# Patient Record
Sex: MALE | Race: WHITE | HISPANIC OR LATINO | Employment: FULL TIME | URBAN - METROPOLITAN AREA
[De-identification: names, ages, dates, MRNs, and addresses within clinical notes are randomized per-mention and may not be internally consistent; named-entity substitution may affect disease eponyms.]

---

## 2022-09-11 ENCOUNTER — HOSPITAL ENCOUNTER (EMERGENCY)
Facility: HOSPITAL | Age: 32
Discharge: HOME | End: 2022-09-12
Attending: EMERGENCY MEDICINE | Admitting: EMERGENCY MEDICINE

## 2022-09-11 VITALS
SYSTOLIC BLOOD PRESSURE: 146 MMHG | BODY MASS INDEX: 35.36 KG/M2 | HEIGHT: 66 IN | TEMPERATURE: 98.9 F | WEIGHT: 220 LBS | HEART RATE: 85 BPM | OXYGEN SATURATION: 99 % | DIASTOLIC BLOOD PRESSURE: 97 MMHG | RESPIRATION RATE: 18 BRPM

## 2022-09-11 DIAGNOSIS — S61.216A LACERATION OF RIGHT LITTLE FINGER WITHOUT FOREIGN BODY WITHOUT DAMAGE TO NAIL, INITIAL ENCOUNTER: Primary | ICD-10-CM

## 2022-09-11 PROCEDURE — 99283 EMERGENCY DEPT VISIT LOW MDM: CPT

## 2022-09-12 ENCOUNTER — APPOINTMENT (OUTPATIENT)
Dept: GENERAL RADIOLOGY | Facility: HOSPITAL | Age: 32
End: 2022-09-12

## 2022-09-12 PROCEDURE — 73140 X-RAY EXAM OF FINGER(S): CPT

## 2022-09-12 PROCEDURE — 25010000002 TETANUS-DIPHTH-ACELL PERTUSSIS 5-2.5-18.5 LF-MCG/0.5 SUSPENSION PREFILLED SYRINGE: Performed by: PHYSICIAN ASSISTANT

## 2022-09-12 PROCEDURE — 90471 IMMUNIZATION ADMIN: CPT | Performed by: PHYSICIAN ASSISTANT

## 2022-09-12 PROCEDURE — 90715 TDAP VACCINE 7 YRS/> IM: CPT | Performed by: PHYSICIAN ASSISTANT

## 2022-09-12 RX ORDER — LIDOCAINE HYDROCHLORIDE 10 MG/ML
10 INJECTION, SOLUTION EPIDURAL; INFILTRATION; INTRACAUDAL; PERINEURAL ONCE
Status: COMPLETED | OUTPATIENT
Start: 2022-09-12 | End: 2022-09-12

## 2022-09-12 RX ORDER — BACITRACIN, NEOMYCIN, POLYMYXIN B 400; 3.5; 5 [USP'U]/G; MG/G; [USP'U]/G
1 OINTMENT TOPICAL ONCE
Status: COMPLETED | OUTPATIENT
Start: 2022-09-12 | End: 2022-09-12

## 2022-09-12 RX ADMIN — TETANUS TOXOID, REDUCED DIPHTHERIA TOXOID AND ACELLULAR PERTUSSIS VACCINE, ADSORBED 0.5 ML: 5; 2.5; 8; 8; 2.5 SUSPENSION INTRAMUSCULAR at 01:06

## 2022-09-12 RX ADMIN — LIDOCAINE HYDROCHLORIDE 10 ML: 10 INJECTION, SOLUTION EPIDURAL; INFILTRATION; INTRACAUDAL; PERINEURAL at 02:52

## 2022-09-12 RX ADMIN — BACITRACIN, NEOMYCIN, POLYMYXIN B 1 APPLICATION: 400; 3.5; 5 OINTMENT TOPICAL at 03:18

## 2022-09-12 NOTE — DISCHARGE INSTRUCTIONS
X-rays of the right fifth finger revealed no bony abnormality and no radiopaque foreign body.  Patient tolerated suture placement well.  Tetanus status updated.  Recommend suture removal in 7 days.  Keep wound clean with soap and water and open to the air while at home.  Tylenol/ibuprofen every 4-6 hours as needed for pain.  Return to the ER if any worsening symptoms of redness, warmth, or drainage.

## 2022-09-12 NOTE — ED PROVIDER NOTES
Subjective       This is a healthy 32-year-old male that presents the ER with laceration to the right fifth finger.  Patient says that he just got him from work and was cleaning the kitchen and a box knocked over a glass and it broke.  Patient went to reach for the glass because he was trying to keep it from falling on the floor and hurting his small children, and in the process, he cut his right fifth finger, palmar aspect at the base.  There is no active bleeding at present.  Patient has full range of motion.  He is right-handed.  He reports throbbing pain but denies any numbness or tingling.  Tetanus status is not up-to-date.  He does not believe that he has any glass foreign body present.  He said that a large piece poked his hand.  No other injuries noted.      History provided by:  Patient  Finger Laceration  Location:  Right 5th finger laceration; palmar base of phalanx  Onset quality:  Sudden  Duration:  2 hours  Timing:  Constant  Progression:  Unchanged  Chronicity:  New  Context:  Pt was cleaning kitchen and a box knocked a glass over and it broke.  Pt went to reach for glass and accidentally cut his hand; palmar aspect of 5th phalanx.  No active bleeding.  Relieved by:  Nothing  Worsened by:  Range of motion  Ineffective treatments:  Nothing  Associated symptoms: no diarrhea        Review of Systems   Gastrointestinal: Negative for diarrhea.   Musculoskeletal: Positive for arthralgias (Right 5th finger laceration). Negative for joint swelling.   Skin: Positive for wound (laceration to base, palmar aspect of 5th phalanx.). Negative for color change.   Neurological: Negative.  Negative for numbness (No numbness or tingling to the right fifth finger.).       No past medical history on file.    No Known Allergies    No past surgical history on file.    No family history on file.    Social History     Socioeconomic History   • Marital status: Single   Tobacco Use   • Smoking status: Light Tobacco Smoker   •  Smokeless tobacco: Never Used           Objective   Physical Exam  Vitals and nursing note reviewed.   Constitutional:       General: He is not in acute distress.     Appearance: Normal appearance.   Cardiovascular:      Pulses:           Radial pulses are 2+ on the right side.      Comments: Strong right radial and ulnar pulses and brisk capillary refill  Pulmonary:      Effort: Pulmonary effort is normal.      Comments: Regular respiratory rate  Musculoskeletal:      Comments: No bony tenderness to right fifth finger.  Full range of motion, strong against resistance, both extension and flexion.  No concern for tendon injury.   Skin:     Findings: Laceration present.      Comments: 1.5 cm laceration at the palmar base of the right fifth phalanx.  No active bleeding.   Neurological:      Mental Status: He is alert.         Laceration Repair    Date/Time: 9/12/2022 2:10 AM  Performed by: Cathi Davidson PA-C  Authorized by: Jer Villalobos MD     Consent:     Consent obtained:  Verbal    Consent given by:  Patient    Risks, benefits, and alternatives were discussed: yes      Risks discussed:  Infection, poor cosmetic result and poor wound healing  Universal protocol:     Patient identity confirmed:  Verbally with patient  Anesthesia:     Anesthesia method:  Local infiltration    Local anesthetic:  Lidocaine 1% w/o epi  Laceration details:     Location:  Finger    Finger location:  R small finger    Length (cm):  1.5    Depth (mm):  3  Exploration:     Imaging obtained: x-ray      Imaging outcome: foreign body not noted      Wound exploration: wound explored through full range of motion and entire depth of wound visualized      Wound extent: no foreign bodies/material noted, no tendon damage noted and no underlying fracture noted      Contaminated: no    Treatment:     Area cleansed with:  Povidone-iodine and saline    Amount of cleaning:  Standard    Irrigation solution:  Sterile saline    Irrigation method: 4 x  "4's.    Debridement:  None    Undermining:  None    Scar revision: no    Skin repair:     Repair method:  Sutures    Suture size:  4-0    Suture material:  Nylon    Suture technique:  Simple interrupted    Number of sutures:  5  Approximation:     Approximation:  Close  Repair type:     Repair type:  Complex  Post-procedure details:     Dressing:  Antibiotic ointment and adhesive bandage    Procedure completion:  Tolerated well, no immediate complications               ED Course  ED Course as of 09/12/22 0313   Mon Sep 12, 2022   0211 Patient tolerated suture placement well.  Wound was not contaminated and x-rays revealed no radiopaque foreign body.  Tetanus status was updated.  I applied #5 sutures.  See procedure note for details.  Recommend suture removal in 7 days.  Wound is clean and there is no need for antibiotics on discharge.  Return to the ER if any worsening symptoms of redness, warmth, or drainage. [FC]      ED Course User Index  [FC] Cathi Davidson PA-C            No results found for this or any previous visit (from the past 24 hour(s)).  Note: In addition to lab results from this visit, the labs listed above may include labs taken at another facility or during a different encounter within the last 24 hours. Please correlate lab times with ED admission and discharge times for further clarification of the services performed during this visit.    XR Finger 2+ View Right   Final Result      No radiopaque foreign body is seen in the right fifth digit.      Electronically signed by:  Refugio Camacho M.D.     9/12/2022 12:56 AM Mountain Time        Vitals:    09/11/22 2347   BP: 146/97   BP Location: Right arm   Patient Position: Sitting   Pulse: 85   Resp: 18   Temp: 98.9 °F (37.2 °C)   TempSrc: Oral   SpO2: 99%   Weight: 99.8 kg (220 lb)   Height: 167.6 cm (66\")     Medications   neomycin-bacitracin-polymyxin (NEOSPORIN) ointment 1 application (has no administration in time range)   Tetanus-Diphth-Acell " Pertussis (BOOSTRIX) injection 0.5 mL (0.5 mL Intramuscular Given 9/12/22 0106)   lidocaine PF 1% (XYLOCAINE) injection 10 mL (10 mL Infiltration Given 9/12/22 5042)     ECG/EMG Results (last 24 hours)     ** No results found for the last 24 hours. **        No orders to display                                         MDM    Final diagnoses:   Laceration of right little finger without foreign body without damage to nail, initial encounter       ED Disposition  ED Disposition     ED Disposition   Discharge    Condition   Stable    Comment   --             Routine PCP    Schedule an appointment as soon as possible for a visit   As needed         Medication List      Stop    cyclobenzaprine 10 MG tablet  Commonly known as: FLEXERIL     ibuprofen 800 MG tablet  Commonly known as: QUINTIN RIVERS Farrah J, PALuisC  09/12/22 9361

## 2022-09-20 ENCOUNTER — HOSPITAL ENCOUNTER (EMERGENCY)
Facility: HOSPITAL | Age: 32
Discharge: HOME | End: 2022-09-20

## 2022-09-20 VITALS
HEART RATE: 66 BPM | RESPIRATION RATE: 16 BRPM | DIASTOLIC BLOOD PRESSURE: 85 MMHG | SYSTOLIC BLOOD PRESSURE: 128 MMHG | OXYGEN SATURATION: 97 %

## 2022-09-20 PROCEDURE — 99202 OFFICE O/P NEW SF 15 MIN: CPT

## 2024-04-15 ENCOUNTER — APPOINTMENT (OUTPATIENT)
Dept: CT IMAGING | Facility: HOSPITAL | Age: 34
End: 2024-04-15
Payer: MEDICAID

## 2024-04-15 ENCOUNTER — HOSPITAL ENCOUNTER (EMERGENCY)
Facility: HOSPITAL | Age: 34
Discharge: HOME | End: 2024-04-16
Attending: EMERGENCY MEDICINE
Payer: MEDICAID

## 2024-04-15 VITALS
TEMPERATURE: 98.1 F | HEART RATE: 61 BPM | HEIGHT: 66 IN | WEIGHT: 225 LBS | SYSTOLIC BLOOD PRESSURE: 160 MMHG | RESPIRATION RATE: 18 BRPM | OXYGEN SATURATION: 98 % | BODY MASS INDEX: 36.16 KG/M2 | DIASTOLIC BLOOD PRESSURE: 105 MMHG

## 2024-04-15 DIAGNOSIS — K21.9 GASTROESOPHAGEAL REFLUX DISEASE WITHOUT ESOPHAGITIS: ICD-10-CM

## 2024-04-15 DIAGNOSIS — R10.9 NONSPECIFIC ABDOMINAL PAIN: Primary | ICD-10-CM

## 2024-04-15 LAB
ALBUMIN SERPL-MCNC: 4.5 G/DL (ref 3.5–5.2)
ALBUMIN/GLOB SERPL: 1.5 G/DL
ALP SERPL-CCNC: 69 U/L (ref 39–117)
ALT SERPL W P-5'-P-CCNC: 19 U/L (ref 1–41)
ANION GAP SERPL CALCULATED.3IONS-SCNC: 11 MMOL/L (ref 5–15)
AST SERPL-CCNC: 19 U/L (ref 1–40)
BACTERIA UR QL AUTO: ABNORMAL /HPF
BASOPHILS # BLD AUTO: 0.08 10*3/MM3 (ref 0–0.2)
BASOPHILS NFR BLD AUTO: 0.7 % (ref 0–1.5)
BILIRUB SERPL-MCNC: 0.2 MG/DL (ref 0–1.2)
BILIRUB UR QL STRIP: NEGATIVE
BUN SERPL-MCNC: 16 MG/DL (ref 6–20)
BUN/CREAT SERPL: 20.5 (ref 7–25)
CALCIUM SPEC-SCNC: 9.1 MG/DL (ref 8.6–10.5)
CHLORIDE SERPL-SCNC: 104 MMOL/L (ref 98–107)
CLARITY UR: CLEAR
CO2 SERPL-SCNC: 25 MMOL/L (ref 22–29)
COLOR UR: YELLOW
CREAT SERPL-MCNC: 0.78 MG/DL (ref 0.76–1.27)
D-LACTATE SERPL-SCNC: 0.8 MMOL/L (ref 0.5–2)
DEPRECATED RDW RBC AUTO: 38.4 FL (ref 37–54)
EGFRCR SERPLBLD CKD-EPI 2021: 121.5 ML/MIN/1.73
EOSINOPHIL # BLD AUTO: 0.19 10*3/MM3 (ref 0–0.4)
EOSINOPHIL NFR BLD AUTO: 1.5 % (ref 0.3–6.2)
ERYTHROCYTE [DISTWIDTH] IN BLOOD BY AUTOMATED COUNT: 12.9 % (ref 12.3–15.4)
GLOBULIN UR ELPH-MCNC: 3 GM/DL
GLUCOSE SERPL-MCNC: 95 MG/DL (ref 65–99)
GLUCOSE UR STRIP-MCNC: NEGATIVE MG/DL
HCT VFR BLD AUTO: 45.1 % (ref 37.5–51)
HGB BLD-MCNC: 14.9 G/DL (ref 13–17.7)
HGB UR QL STRIP.AUTO: ABNORMAL
HOLD SPECIMEN: NORMAL
HOLD SPECIMEN: NORMAL
HYALINE CASTS UR QL AUTO: ABNORMAL /LPF
IMM GRANULOCYTES # BLD AUTO: 0.08 10*3/MM3 (ref 0–0.05)
IMM GRANULOCYTES NFR BLD AUTO: 0.7 % (ref 0–0.5)
KETONES UR QL STRIP: NEGATIVE
LEUKOCYTE ESTERASE UR QL STRIP.AUTO: NEGATIVE
LIPASE SERPL-CCNC: 16 U/L (ref 13–60)
LYMPHOCYTES # BLD AUTO: 3.82 10*3/MM3 (ref 0.7–3.1)
LYMPHOCYTES NFR BLD AUTO: 31.1 % (ref 19.6–45.3)
MCH RBC QN AUTO: 27 PG (ref 26.6–33)
MCHC RBC AUTO-ENTMCNC: 33 G/DL (ref 31.5–35.7)
MCV RBC AUTO: 81.9 FL (ref 79–97)
MONOCYTES # BLD AUTO: 0.54 10*3/MM3 (ref 0.1–0.9)
MONOCYTES NFR BLD AUTO: 4.4 % (ref 5–12)
NEUTROPHILS NFR BLD AUTO: 61.6 % (ref 42.7–76)
NEUTROPHILS NFR BLD AUTO: 7.56 10*3/MM3 (ref 1.7–7)
NITRITE UR QL STRIP: NEGATIVE
NRBC BLD AUTO-RTO: 0 /100 WBC (ref 0–0.2)
PH UR STRIP.AUTO: 6.5 [PH] (ref 5–8)
PLATELET # BLD AUTO: 310 10*3/MM3 (ref 140–450)
PMV BLD AUTO: 9.4 FL (ref 6–12)
POTASSIUM SERPL-SCNC: 4 MMOL/L (ref 3.5–5.2)
PROT SERPL-MCNC: 7.5 G/DL (ref 6–8.5)
PROT UR QL STRIP: NEGATIVE
RBC # BLD AUTO: 5.51 10*6/MM3 (ref 4.14–5.8)
RBC # UR STRIP: ABNORMAL /HPF
REF LAB TEST METHOD: ABNORMAL
SODIUM SERPL-SCNC: 140 MMOL/L (ref 136–145)
SP GR UR STRIP: 1.02 (ref 1–1.03)
SQUAMOUS #/AREA URNS HPF: ABNORMAL /HPF
TROPONIN T SERPL HS-MCNC: <6 NG/L
UROBILINOGEN UR QL STRIP: ABNORMAL
WBC # UR STRIP: ABNORMAL /HPF
WBC NRBC COR # BLD AUTO: 12.27 10*3/MM3 (ref 3.4–10.8)
WHOLE BLOOD HOLD COAG: NORMAL
WHOLE BLOOD HOLD SPECIMEN: NORMAL

## 2024-04-15 PROCEDURE — 25510000001 IOPAMIDOL 61 % SOLUTION: Performed by: EMERGENCY MEDICINE

## 2024-04-15 PROCEDURE — 99285 EMERGENCY DEPT VISIT HI MDM: CPT

## 2024-04-15 PROCEDURE — 93005 ELECTROCARDIOGRAM TRACING: CPT | Performed by: PHYSICIAN ASSISTANT

## 2024-04-15 PROCEDURE — 84484 ASSAY OF TROPONIN QUANT: CPT | Performed by: PHYSICIAN ASSISTANT

## 2024-04-15 PROCEDURE — 83605 ASSAY OF LACTIC ACID: CPT | Performed by: PHYSICIAN ASSISTANT

## 2024-04-15 PROCEDURE — 74177 CT ABD & PELVIS W/CONTRAST: CPT

## 2024-04-15 PROCEDURE — 96374 THER/PROPH/DIAG INJ IV PUSH: CPT

## 2024-04-15 PROCEDURE — 83690 ASSAY OF LIPASE: CPT | Performed by: EMERGENCY MEDICINE

## 2024-04-15 PROCEDURE — 81001 URINALYSIS AUTO W/SCOPE: CPT | Performed by: EMERGENCY MEDICINE

## 2024-04-15 PROCEDURE — 85025 COMPLETE CBC W/AUTO DIFF WBC: CPT | Performed by: EMERGENCY MEDICINE

## 2024-04-15 PROCEDURE — 80053 COMPREHEN METABOLIC PANEL: CPT | Performed by: EMERGENCY MEDICINE

## 2024-04-15 RX ORDER — DICYCLOMINE HYDROCHLORIDE 10 MG/1
20 CAPSULE ORAL ONCE
Status: COMPLETED | OUTPATIENT
Start: 2024-04-15 | End: 2024-04-15

## 2024-04-15 RX ORDER — DICYCLOMINE HCL 20 MG
20 TABLET ORAL EVERY 8 HOURS PRN
Qty: 15 TABLET | Refills: 0 | Status: SHIPPED | OUTPATIENT
Start: 2024-04-15 | End: 2024-04-20

## 2024-04-15 RX ORDER — FAMOTIDINE 40 MG/1
40 TABLET, FILM COATED ORAL NIGHTLY PRN
Qty: 30 TABLET | Refills: 0 | Status: SHIPPED | OUTPATIENT
Start: 2024-04-15 | End: 2024-05-15

## 2024-04-15 RX ORDER — FAMOTIDINE 10 MG/ML
20 INJECTION, SOLUTION INTRAVENOUS ONCE
Status: COMPLETED | OUTPATIENT
Start: 2024-04-15 | End: 2024-04-15

## 2024-04-15 RX ORDER — SODIUM CHLORIDE 9 MG/ML
10 INJECTION, SOLUTION INTRAMUSCULAR; INTRAVENOUS; SUBCUTANEOUS AS NEEDED
Status: DISCONTINUED | OUTPATIENT
Start: 2024-04-15 | End: 2024-04-16 | Stop reason: HOSPADM

## 2024-04-15 RX ADMIN — IOPAMIDOL 100 ML: 612 INJECTION, SOLUTION INTRAVENOUS at 22:16

## 2024-04-15 RX ADMIN — DICYCLOMINE HYDROCHLORIDE 20 MG: 10 CAPSULE ORAL at 23:14

## 2024-04-15 RX ADMIN — FAMOTIDINE 20 MG: 10 INJECTION, SOLUTION INTRAVENOUS at 23:14

## 2024-04-16 LAB
HOLD SPECIMEN: NORMAL
QT INTERVAL: 378 MS
QTC INTERVAL: 408 MS

## 2024-04-16 NOTE — DISCHARGE INSTRUCTIONS
Symptomatic care is recommended. Take all medications as prescribed and instructed. Follow up with primary care and gastroenterology as directed or return to Emergency Department with worsening of symptoms.

## 2024-04-23 LAB
QT INTERVAL: 378 MS
QTC INTERVAL: 408 MS

## 2024-04-23 NOTE — ED PROVIDER NOTES
EMERGENCY DEPARTMENT ENCOUNTER    Pt Name: Daniel Mims  MRN: [id number]  Pt :   1990  Room Number:    Date of encounter:  4/15/2024  PCP: Provider, No Known  ED Provider: CHAKA Del Toro    Historian: Patient    HPI:  Chief Complaint: Abdominal Pain    Context: Daniel Mims is a 33 y.o. male who presents to the ED c/o abdominal pain.  Patient reports that he began to experience abdominal pain Saturday afternoon (2024).  He denies any nausea or vomiting.  He reports no fever.  He has not had diarrhea or constipation.  He reports no urinary complaints.  Patient's prior abdominal surgeries include an appendectomy.  Patient shares that he does vape.  He does not use any alcohol.  He reports trying some Pepto-Bismol for his symptoms but that it did not help.  He denies anything specific that seems to make his symptoms better or worse and reports no additional associated symptoms on exam.  Spouse was present at bedside shares that she believes that his symptoms are from eating too much spicy food.  HPI     REVIEW OF SYSTEMS  A chief complaint appropriate review of systems was completed and is negative except as noted in the HPI.     PAST MEDICAL HISTORY  No past medical history on file.    PAST SURGICAL HISTORY  No past surgical history on file.    FAMILY HISTORY  No family history on file.    SOCIAL HISTORY  Social History     Socioeconomic History    Marital status: Single   Tobacco Use    Smoking status: Light Smoker    Smokeless tobacco: Never       ALLERGIES  Patient has no known allergies.    PHYSICAL EXAM  Physical Exam  GENERAL:   Appears in no acute distress.  Nontoxic in appearance.  HENT: Nares patent.  EYES: No scleral icterus.  CV: Regular rhythm, regular rate.  RESPIRATORY: Normal effort.  ABDOMEN: Soft, nontender. No evidence of acute abdomen on physical exam. No rebound or guarding. No peritoneal signs.   MUSCULOSKELETAL: No deformities.   NEURO: Alert, moves all  extremities, follows commands.  SKIN: Warm, dry, no rash visualized.    I have reviewed the triage vital signs and nursing notes.     LAB RESULTS  Results for orders placed or performed during the hospital encounter of 04/15/24   Comprehensive Metabolic Panel    Specimen: Blood   Result Value Ref Range    Glucose 95 65 - 99 mg/dL    BUN 16 6 - 20 mg/dL    Creatinine 0.78 0.76 - 1.27 mg/dL    Sodium 140 136 - 145 mmol/L    Potassium 4.0 3.5 - 5.2 mmol/L    Chloride 104 98 - 107 mmol/L    CO2 25.0 22.0 - 29.0 mmol/L    Calcium 9.1 8.6 - 10.5 mg/dL    Total Protein 7.5 6.0 - 8.5 g/dL    Albumin 4.5 3.5 - 5.2 g/dL    ALT (SGPT) 19 1 - 41 U/L    AST (SGOT) 19 1 - 40 U/L    Alkaline Phosphatase 69 39 - 117 U/L    Total Bilirubin 0.2 0.0 - 1.2 mg/dL    Globulin 3.0 gm/dL    A/G Ratio 1.5 g/dL    BUN/Creatinine Ratio 20.5 7.0 - 25.0    Anion Gap 11.0 5.0 - 15.0 mmol/L    eGFR 121.5 >60.0 mL/min/1.73   Lipase    Specimen: Blood   Result Value Ref Range    Lipase 16 13 - 60 U/L   Urinalysis With Microscopic If Indicated (No Culture) - Urine, Clean Catch    Specimen: Urine, Clean Catch   Result Value Ref Range    Color, UA Yellow Yellow, Straw    Appearance, UA Clear Clear    pH, UA 6.5 5.0 - 8.0    Specific Gravity, UA 1.022 1.001 - 1.030    Glucose, UA Negative Negative    Ketones, UA Negative Negative    Bilirubin, UA Negative Negative    Blood, UA Trace (A) Negative    Protein, UA Negative Negative    Leuk Esterase, UA Negative Negative    Nitrite, UA Negative Negative    Urobilinogen, UA 0.2 E.U./dL 0.2 - 1.0 E.U./dL   CBC Auto Differential    Specimen: Blood   Result Value Ref Range    WBC 12.27 (H) 3.40 - 10.80 10*3/mm3    RBC 5.51 4.14 - 5.80 10*6/mm3    Hemoglobin 14.9 13.0 - 17.7 g/dL    Hematocrit 45.1 37.5 - 51.0 %    MCV 81.9 79.0 - 97.0 fL    MCH 27.0 26.6 - 33.0 pg    MCHC 33.0 31.5 - 35.7 g/dL    RDW 12.9 12.3 - 15.4 %    RDW-SD 38.4 37.0 - 54.0 fl    MPV 9.4 6.0 - 12.0 fL    Platelets 310 140 - 450 10*3/mm3     Neutrophil % 61.6 42.7 - 76.0 %    Lymphocyte % 31.1 19.6 - 45.3 %    Monocyte % 4.4 (L) 5.0 - 12.0 %    Eosinophil % 1.5 0.3 - 6.2 %    Basophil % 0.7 0.0 - 1.5 %    Immature Grans % 0.7 (H) 0.0 - 0.5 %    Neutrophils, Absolute 7.56 (H) 1.70 - 7.00 10*3/mm3    Lymphocytes, Absolute 3.82 (H) 0.70 - 3.10 10*3/mm3    Monocytes, Absolute 0.54 0.10 - 0.90 10*3/mm3    Eosinophils, Absolute 0.19 0.00 - 0.40 10*3/mm3    Basophils, Absolute 0.08 0.00 - 0.20 10*3/mm3    Immature Grans, Absolute 0.08 (H) 0.00 - 0.05 10*3/mm3    nRBC 0.0 0.0 - 0.2 /100 WBC   Lactic Acid, Plasma    Specimen: Blood   Result Value Ref Range    Lactate 0.8 0.5 - 2.0 mmol/L   Single High Sensitivity Troponin T    Specimen: Blood   Result Value Ref Range    HS Troponin T <6 <22 ng/L   Urinalysis, Microscopic Only - Urine, Clean Catch    Specimen: Urine, Clean Catch   Result Value Ref Range    RBC, UA 3-5 (A) None Seen, 0-2 /HPF    WBC, UA 0-2 None Seen, 0-2 /HPF    Bacteria, UA None Seen None Seen, Trace /HPF    Squamous Epithelial Cells, UA None Seen None Seen, 0-2 /HPF    Hyaline Casts, UA None Seen 0 - 6 /LPF    Methodology Automated Microscopy    ECG 12 Lead Other; generalized pain   Result Value Ref Range    QT Interval 378 ms    QTC Interval 408 ms   Green Top (Gel)   Result Value Ref Range    Extra Tube Hold for add-ons.    Lavender Top   Result Value Ref Range    Extra Tube hold for add-on    Gold Top - SST   Result Value Ref Range    Extra Tube Hold for add-ons.    Gray Top   Result Value Ref Range    Extra Tube Hold for add-ons.    Light Blue Top   Result Value Ref Range    Extra Tube Hold for add-ons.        If labs were ordered, I independently reviewed the results and considered them in treating the patient.    RADIOLOGY  CT Abdomen Pelvis With Contrast   Final Result   Normal exam.               Electronically Signed: Ryan German MD     4/15/2024 10:25 PM EDT     Workstation ID: [id number]        [x] Radiologist's Report Reviewed:  I  ordered and independently interpreted the above noted radiographic studies.  See radiologist's dictation for official interpretation.      PROCEDURES    Procedures    ECG 12 Lead Other; generalized pain   Preliminary Result   Test Reason : Other~   Blood Pressure :   */*   mmHG   Vent. Rate :  70 BPM     Atrial Rate :  70 BPM      P-R Int : 170 ms          QRS Dur :  84 ms       QT Int : 378 ms       P-R-T Axes :  36  17  45 degrees      QTc Int : 408 ms      Normal sinus rhythm   Normal ECG   No previous ECGs available      Referred By: RICK           Confirmed By:           MEDICATIONS GIVEN IN ER    Medications   iopamidol (ISOVUE-300) 61 % injection 100 mL (100 mL Intravenous Given 4/15/24 2216)   famotidine (PEPCID) injection 20 mg (20 mg Intravenous Given 4/15/24 2314)   dicyclomine (BENTYL) capsule 20 mg (20 mg Oral Given 4/15/24 2314)       MEDICAL DECISION MAKING, PROGRESS, and CONSULTS   Medical Decision Making  Problems Addressed:  Gastroesophageal reflux disease without esophagitis: complicated acute illness or injury  Nonspecific abdominal pain: complicated acute illness or injury    Amount and/or Complexity of Data Reviewed  Labs: ordered.  Radiology: ordered.  ECG/medicine tests: ordered.    Risk  Prescription drug management.        All labs have been independently reviewed by me.  All radiology studies have been interpreted by me and the radiologist dictating the report.  All EKG's have been independently interpreted by me as well as and overseeing attending physician.    [] Discussed with radiology regarding test interpretation:    Discussion below represents my analysis of pertinent findings related to patient's condition, differential diagnosis, treatment plan and final disposition.    Differential diagnosis:  The differential diagnosis associated with the patient's presentation includes: Dyspepsia, GERD, viral gastroenteritis, constipation, gallbladder disease, pancreatitis, diverticulitis,  kidney stone, UTI, hernia, bowel obstruction, colitis and others.      Additional sources  Discussed/ obtained information from independent historians:   [x] Spouse  [] Parent  [] Family member  [] Friend  [] EMS   [] Other:  External (non-ED) record review:   [] Inpatient record:   [] Office record:   [] Outpatient record:   [] Prior Outpatient labs:   [] Prior Outpatient radiology:   [] Primary Care record:   [] Outside ED record:   [] Other:   Patient's care impacted by:   [] Diabetes  [] Hypertension  [] Hyperlipidemia  [] Hypothyroidism   [] Coronary Artery Disease   [] COPD   [] Cancer   [] Obesity  [] GERD   [] Tobacco Abuse   [] Substance Abuse    [] Anxiety   [] Depression   [] Other:   Care significantly affected by Social Determinants of Health (housing and economic circumstances, unemployment)    [] Yes     [x] No   If yes, Patient's care significantly limited by  Social Determinants of Health including:   [] Inadequate housing   [] Low income   [] Alcoholism and drug addiction in family   [] Problems related to primary support group   [] Unemployment   [] Problems related to employment   [] Other Social Determinants of Health:     Shared decision making:  I had a discussion with the patient/family regarding diagnosis, diagnostic results, treatment plan, and medications.  The patient/family indicated understanding of these instructions.  I spent adequate time at the bedside preceding discharge necessary to personally discuss the aftercare instructions, giving patient education, providing explanations of the results of our evaluations/findings, and my decision making to assure that the patient/family understand the plan of care.  Time was allotted to answer questions at that time and throughout the ED course.  Emphasis was placed on timely follow-up after discharge.  I also discussed the potential for the development of an acute emergent condition requiring further evaluation, admission, or even surgical  intervention. I discussed that we found nothing during the visit today indicating the need for further workup, admission, or the presence of an unstable medical condition.  I encouraged the patient to return to the emergency department immediately for ANY concerns, worsening, new complaints, or if symptoms persist and unable to seek follow-up in a timely fashion.  The patient/family expressed understanding and agreement with this plan.  The patient will follow-up with primary care for reevaluation.      Orders placed during this visit:  Orders Placed This Encounter   Procedures    CT Abdomen Pelvis With Contrast    [facility] Draw    Comprehensive Metabolic Panel    Lipase    Urinalysis With Microscopic If Indicated (No Culture) - Urine, Clean Catch    CBC Auto Differential    Lactic Acid, Plasma    Single High Sensitivity Troponin T    Urinalysis, Microscopic Only - Urine, Clean Catch    ECG 12 Lead Other; generalized pain    CBC & Differential    Green Top (Gel)    Lavender Top    Gold Top - SST    Gray Top    Light Blue Top       I considered prescription management  with:   [] Pain medication  [] Antiviral  [] Antibiotic   [] Other:   Rationale:    ED Course:    ED Course as of 04/23/24 1106   Mon Apr 15, 2024   2047 Vitals and Telemetry tracing was reviewed and directly interpreted by myself demonstrating blood pressure 160/105, afebrile, heart rate of 74, respirations of 18 breaths/min and oxygen saturation 97% on room air [JG]   2047 BP(!): 160/105 [JG]   2047 Temp: 98.1 °F (36.7 °C) [JG]   2047 Heart Rate: 74 [JG]   2047 Resp: 18 [JG]   2047 SpO2: 97 % [JG]   2200 EKG personally interpreted by myself in addition to interpretation by attending without evidence of acute ischemic changes.   [JG]   2245 CT scan of abdomen and pelvis personally interpreted by myself with official read provided by radiology demonstrates no acute abnormalities. [JG]   2300 Labs studies were reviewed and directly interpreted by myself  and demonstrated no acute findings [JG]   2306 On reassessment at bedside patient continues to complain of some abdominal discomfort.  I reviewed findings of ER workup including labs and imaging with patient.  I did share with him that we will be treating his symptoms and there was nothing acute in the findings.  And is agreeable to plan of care.  Will medicate patient and reassess. [JG]   2343 Patient presents to ED with complaints of abdominal pain. Abdominal exam without peritoneal signs. No evidence of acute abdomen at this time. Well appearing. Low suspicion for acute hepatobiliary disease (includng acute cholecystitis), acute pancreatitis, PUD (including perforation), acute infectious processes (pneumonia, hepatitis, pyelonephritis) bowel obstruction or viscus perforation. Presentation not consistent with other acute, emergent causes of abdominal pain at this time. Labs, imaging without acute findings. EKG shows normal sinus rhythm. Patient responded well to supportive care in ED. Discharged with continued symptomatic care and outpatient follow up as recommended. Return precautions provided.  [JG]      ED Course User Index  [JG] Omid Bliss PA            DIAGNOSIS  Final diagnoses:   Nonspecific abdominal pain   Gastroesophageal reflux disease without esophagitis       DISPOSITION    ED Disposition       ED Disposition   Discharge    Condition   Stable    Comment   --               Please note that portions of this document were completed with voice recognition software.        Omid Bliss PA  04/23/24 6743